# Patient Record
Sex: FEMALE | URBAN - METROPOLITAN AREA
[De-identification: names, ages, dates, MRNs, and addresses within clinical notes are randomized per-mention and may not be internally consistent; named-entity substitution may affect disease eponyms.]

---

## 2019-04-17 ENCOUNTER — IMPORTED ENCOUNTER (OUTPATIENT)
Dept: URBAN - METROPOLITAN AREA CLINIC 75 | Facility: CLINIC | Age: 79
End: 2019-04-17

## 2019-04-17 PROCEDURE — 92004 COMPRE OPH EXAM NEW PT 1/>: CPT

## 2019-04-17 PROCEDURE — 92015 DETERMINE REFRACTIVE STATE: CPT

## 2019-06-19 ENCOUNTER — APPOINTMENT (OUTPATIENT)
Age: 79
Setting detail: DERMATOLOGY
End: 2019-06-19

## 2019-06-19 DIAGNOSIS — L21.8 OTHER SEBORRHEIC DERMATITIS: ICD-10-CM

## 2019-06-19 DIAGNOSIS — L29.89 OTHER PRURITUS: ICD-10-CM

## 2019-06-19 DIAGNOSIS — Z85.828 PERSONAL HISTORY OF OTHER MALIGNANT NEOPLASM OF SKIN: ICD-10-CM

## 2019-06-19 PROBLEM — L29.8 OTHER PRURITUS: Status: ACTIVE | Noted: 2019-06-19

## 2019-06-19 PROCEDURE — ? PRESCRIPTION

## 2019-06-19 PROCEDURE — ? TREATMENT REGIMEN

## 2019-06-19 PROCEDURE — ? COUNSELING

## 2019-06-19 PROCEDURE — 99202 OFFICE O/P NEW SF 15 MIN: CPT

## 2019-06-19 PROCEDURE — ? RECOMMENDATIONS

## 2019-06-19 PROCEDURE — ? DIAGNOSIS COMMENT

## 2019-06-19 RX ORDER — KETOCONAZOLE 20.5 MG/ML
SHAMPOO, SUSPENSION TOPICAL QOD
Qty: 1 | Refills: 4 | Status: ERX | COMMUNITY
Start: 2019-06-19

## 2019-06-19 RX ORDER — HYDROCORTISONE 25 MG/G
CREAM TOPICAL EVERY MORNING
Qty: 1 | Refills: 3 | Status: ERX | COMMUNITY
Start: 2019-06-19

## 2019-06-19 RX ORDER — FLUOCINONIDE 0.5 MG/ML
SOLUTION TOPICAL
Qty: 1 | Refills: 3 | Status: ERX | COMMUNITY
Start: 2019-06-19

## 2019-06-19 RX ADMIN — KETOCONAZOLE: 20.5 SHAMPOO, SUSPENSION TOPICAL at 20:53

## 2019-06-19 RX ADMIN — HYDROCORTISONE: 25 CREAM TOPICAL at 20:57

## 2019-06-19 RX ADMIN — FLUOCINONIDE: 0.5 SOLUTION TOPICAL at 20:54

## 2019-06-19 ASSESSMENT — LOCATION ZONE DERM
LOCATION ZONE: FACE
LOCATION ZONE: LEG
LOCATION ZONE: SCALP

## 2019-06-19 ASSESSMENT — LOCATION SIMPLE DESCRIPTION DERM
LOCATION SIMPLE: LEFT EYEBROW
LOCATION SIMPLE: LEFT CHEEK
LOCATION SIMPLE: LEFT PRETIBIAL REGION
LOCATION SIMPLE: RIGHT THIGH
LOCATION SIMPLE: SCALP
LOCATION SIMPLE: RIGHT EYEBROW

## 2019-06-19 ASSESSMENT — LOCATION DETAILED DESCRIPTION DERM
LOCATION DETAILED: LEFT SUPERIOR PARIETAL SCALP
LOCATION DETAILED: LEFT PROXIMAL PRETIBIAL REGION
LOCATION DETAILED: RIGHT CENTRAL EYEBROW
LOCATION DETAILED: RIGHT ANTERIOR DISTAL THIGH
LOCATION DETAILED: LEFT CENTRAL EYEBROW
LOCATION DETAILED: LEFT INFERIOR CENTRAL MALAR CHEEK

## 2019-06-19 ASSESSMENT — SEVERITY ASSESSMENT: HOW SEVERE IS THIS PATIENT'S CONDITION?: MILD

## 2019-06-19 NOTE — PROCEDURE: TREATMENT REGIMEN
Plan: Patient to return to clinic in 1 month for reassessment
Detail Level: Simple
Initiate Treatment: Ketoconazole 2% shampoo: Use to wash scalp QOD when flared. Leave on for 10 minutes before rinsing.\\nFluocinonide solution: Apply to scalp twice daily x 2 weeks PRN to flares.\\nHydrocortisone 2.5% cream: Apply to affected areas on the face bid x 2 weeks. May use once a day for an additional 2 weeks if needed.

## 2019-06-19 NOTE — PROCEDURE: MIPS QUALITY
Quality 47: Advance Care Plan: Advance Care Planning discussed and documented; advance care plan or surrogate decision maker documented in the medical record.
Quality 111:Pneumonia Vaccination Status For Older Adults: Pneumococcal Vaccination Previously Received
Additional Notes: \\n\\n*** Pneumonia Vaccine:  YES
Detail Level: Detailed
Additional Notes: \\n\\n*** Advance Care Plan/Living Will: YES

## 2019-06-19 NOTE — PROCEDURE: DIAGNOSIS COMMENT
Comment: Patient complains of intense itching in scalp. Secondary to seborrheic dermatitis.
Detail Level: Simple

## 2019-06-19 NOTE — PROCEDURE: RECOMMENDATIONS
Recommendation Preamble: The following recommendations were made during the visit:
Detail Level: Zone
Recommendations (Free Text): Annual FBSE

## 2023-10-22 ASSESSMENT — VISUAL ACUITY
OS_CC: 20/80 -2
OD_CC: 20/40

## 2023-10-22 ASSESSMENT — TONOMETRY
OS_IOP_MMHG: 12
OD_IOP_MMHG: 12